# Patient Record
Sex: MALE | Race: WHITE | Employment: STUDENT | URBAN - METROPOLITAN AREA
[De-identification: names, ages, dates, MRNs, and addresses within clinical notes are randomized per-mention and may not be internally consistent; named-entity substitution may affect disease eponyms.]

---
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
## 2025-02-06 ENCOUNTER — DOCUMENTATION (OUTPATIENT)
Dept: FAMILY MEDICINE CLINIC | Facility: CLINIC | Age: 20
End: 2025-02-06
Payer: COMMERCIAL

## 2025-02-06 DIAGNOSIS — J10.1 INFLUENZA A: ICD-10-CM

## 2025-02-06 DIAGNOSIS — J11.1 INFLUENZA: Primary | ICD-10-CM

## 2025-02-24 ENCOUNTER — OFFICE VISIT (OUTPATIENT)
Dept: FAMILY MEDICINE CLINIC | Facility: CLINIC | Age: 20
End: 2025-02-24
Payer: COMMERCIAL

## 2025-02-24 VITALS
SYSTOLIC BLOOD PRESSURE: 124 MMHG | BODY MASS INDEX: 21.92 KG/M2 | TEMPERATURE: 98.2 F | OXYGEN SATURATION: 98 % | WEIGHT: 165.4 LBS | HEIGHT: 73 IN | HEART RATE: 79 BPM | DIASTOLIC BLOOD PRESSURE: 73 MMHG

## 2025-02-24 DIAGNOSIS — F41.9 ANXIETY AND DEPRESSION: Primary | ICD-10-CM

## 2025-02-24 DIAGNOSIS — F32.A ANXIETY AND DEPRESSION: Primary | ICD-10-CM

## 2025-02-24 PROCEDURE — 99213 OFFICE O/P EST LOW 20 MIN: CPT | Performed by: NURSE PRACTITIONER
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
## 2025-03-03 NOTE — PROGRESS NOTES
Chief Complaint  Anxiety (Follow up)    Subjective          Bam Martell is a 19 y.o. male who presents to [facility]     History of Present Illness  History of Present Illness  The patient presents for evaluation of ADHD, anxiety, and sleep disturbance.    He reports a positive response to his current medication regimen, with no adverse effects noted. He has resumed his academic pursuits without any significant issues. Despite not perceiving an improvement in his focus, he maintains satisfactory academic performance. He has not discussed his focus issues with his father.    His anxiety levels are reported as manageable. He has not yet initiated counseling sessions but is in the process of scheduling an appointment.    He experiences frequent awakenings at night, often accompanied by restlessness, but is able to return to sleep within approximately an hour. He attributes these sleep disturbances to preoccupation with daily events. Despite these interruptions, he reports being able to function adequately during the day.    IMMUNIZATIONS  He received a meningitis vaccine at age 16.      The PHQ has not been completed during this encounter.               Health Maintenance Due   Topic Date Due    HEPATITIS C SCREENING  Never done    MENINGOCOCCAL B VACCINE (1 of 2 - Standard) Never done    COVID-19 Vaccine (3 - 2024-25 season) 09/01/2024        Review of Systems   Constitutional:  Negative for chills, fatigue and fever.   Respiratory:  Negative for cough and shortness of breath.    Cardiovascular:  Negative for chest pain and palpitations.   Gastrointestinal:  Negative for constipation, diarrhea, nausea and vomiting.   Musculoskeletal:  Negative for back pain and neck pain.   Skin:  Negative for rash.   Neurological:  Negative for dizziness and headaches.          Medical History: has a past medical history of Anxiety.     Surgical History: has no past surgical history on file.      Family History: family history is not on file.     Social History: reports that he has never smoked. He has never been exposed to tobacco smoke. He has never used smokeless tobacco. He reports current alcohol use. He reports that he does not use drugs.    Allergies: Patient has no known allergies.      Current Outpatient Medications:     Dextromethorphan-buPROPion ER (AUVELITY)  MG tablet controlled-release, Take 1 tablet by mouth 2 (Two) Times a Day. Take once daily for 7 days then increase to bid therafter, Disp: 180 tablet, Rfl: 1    fexofenadine (ALLEGRA) 180 MG tablet, Take 1 tablet by mouth Daily., Disp: , Rfl:     hydrOXYzine (ATARAX) 25 MG tablet, Take 1 tablet by mouth Every 6 (Six) Hours As Needed for Anxiety., Disp: 90 tablet, Rfl: 1      Immunization History   Administered Date(s) Administered    COVID-19 (PFIZER) Purple Cap Monovalent 06/10/2021, 07/01/2021    DTaP 01/10/2006, 03/08/2006, 05/09/2006, 11/21/2006, 06/21/2011    DTaP, Unspecified 01/10/2006, 03/08/2006, 05/09/2006, 11/21/2006, 06/21/2011    Fluzone  >6mos 02/24/2025    Fluzone (or Fluarix & Flulaval for VFC) >6mos 11/22/2021, 10/13/2022, 11/06/2023    Fluzone Quad >6mos (Multi-dose) 11/02/2015, 12/26/2017, 11/15/2018, 10/11/2019, 10/22/2020    HPV Quadrivalent 07/27/2017    Hep A, 2 Dose 06/21/2011, 03/27/2012, 09/06/2018    Hep A, Unspecified 06/21/2011, 03/27/2012, 09/06/2018    Hep B, Adolescent or Pediatric 2005, 01/10/2006, 11/21/2006    Hep B, Unspecified 2005, 01/10/2006, 11/21/2006    Hepatitis A 02/12/2018    Hib (HbOC) 01/10/2006, 03/08/2006, 05/09/2006    Hpv9 06/06/2022    IPV 01/10/2006, 03/08/2006, 05/09/2006, 06/21/2011    Influenza TIV (IM) 11/29/2016    MMR 11/21/2006, 06/21/2011    Meningococcal Conjugate 06/06/2022    Meningococcal MCV4P (Menactra) 07/27/2017    Polio, Unspecified 01/10/2006, 03/08/2006, 05/09/2006, 06/21/2011    Tdap 07/27/2017    Varicella 12/28/2006, 06/21/2011         Objective  "      Vitals:    03/17/25 1204   BP: 136/70   Pulse: 58   Temp: 98.4 °F (36.9 °C)   TempSrc: Temporal   SpO2: 97%   Weight: 76.2 kg (168 lb)   Height: 185.4 cm (72.99\")      Body mass index is 22.17 kg/m².   Wt Readings from Last 3 Encounters:   03/17/25 76.2 kg (168 lb) (70%, Z= 0.53)*   02/24/25 75 kg (165 lb 6.4 oz) (67%, Z= 0.45)*   02/04/25 74.8 kg (165 lb) (67%, Z= 0.44)*     * Growth percentiles are based on CDC (Boys, 2-20 Years) data.      BP Readings from Last 3 Encounters:   03/17/25 136/70   02/24/25 124/73   02/04/25 108/62        43 %ile (Z= -0.17) based on CDC (Boys, 2-20 Years) BMI-for-age based on BMI available on 3/17/2025.    Physical Exam  Vitals reviewed.   Constitutional:       Appearance: Normal appearance. He is well-developed.   HENT:      Head: Normocephalic and atraumatic.   Eyes:      Conjunctiva/sclera: Conjunctivae normal.      Pupils: Pupils are equal, round, and reactive to light.   Cardiovascular:      Rate and Rhythm: Normal rate and regular rhythm.      Heart sounds: Normal heart sounds. No murmur heard.  Pulmonary:      Effort: Pulmonary effort is normal.      Breath sounds: Normal breath sounds. No wheezing or rhonchi.   Abdominal:      General: Bowel sounds are normal. There is no distension.      Palpations: Abdomen is soft.      Tenderness: There is no abdominal tenderness.   Skin:     General: Skin is warm and dry.   Neurological:      Mental Status: He is alert and oriented to person, place, and time.   Psychiatric:         Mood and Affect: Mood and affect normal.         Behavior: Behavior normal.         Thought Content: Thought content normal.         Judgment: Judgment normal.         Physical Exam        Result Review :   Results                            Assessment and Plan        Diagnoses and all orders for this visit:    1. Anxiety and depression (Primary)  -     Dextromethorphan-buPROPion ER (AUVELITY)  MG tablet controlled-release; Take 1 tablet by mouth 2 " (Two) Times a Day. Take once daily for 7 days then increase to bid therafter  Dispense: 180 tablet; Refill: 1    2. Insomnia, unspecified type  -     hydrOXYzine (ATARAX) 25 MG tablet; Take 1 tablet by mouth Every 6 (Six) Hours As Needed for Anxiety.  Dispense: 90 tablet; Refill: 1        Assessment & Plan  1. Attention Deficit Hyperactivity Disorder (ADHD).  He reports that his focus has not improved, but he is still making good grades. A prescription for hydroxyzine, to be taken as one tablet twice daily, will be provided. This medication is nonaddictive and may help him achieve a solid 6 hours of sleep. The prescription will be sent to [facility]. He is advised to continue monitoring his symptoms and report any significant changes.    2. Anxiety.  His anxiety is currently manageable. He is advised to continue monitoring his anxiety levels and report any instances where anxiety becomes disruptive to his daily activities.    3. Sleep Disturbance.  He reports difficulty staying asleep, waking up frequently during the night. Hydroxyzine will be prescribed to help improve sleep quality. He is advised to take one tablet twice daily as needed.    4. Medication Management.  He ran out of his current medication on Saturday. Samples of the medication will be provided to ensure continuity of treatment.    Follow-up  The patient will follow up in 6 months.    Return in about 6 months (around 9/17/2025) for Next scheduled follow up.    Patient was given instructions and counseling regarding his condition or for health maintenance advice. Please see specific information pulled into the AVS if appropriate.     ARNULFO Page    Patient or patient representative verbalized consent for the use of Ambient Listening during the visit with  ARNULFO Page for chart documentation. 3/17/2025  15:31 EDT

## 2025-03-17 ENCOUNTER — OFFICE VISIT (OUTPATIENT)
Dept: FAMILY MEDICINE CLINIC | Facility: CLINIC | Age: 20
End: 2025-03-17
Payer: COMMERCIAL

## 2025-03-17 VITALS
DIASTOLIC BLOOD PRESSURE: 70 MMHG | TEMPERATURE: 98.4 F | HEART RATE: 58 BPM | BODY MASS INDEX: 22.26 KG/M2 | HEIGHT: 73 IN | WEIGHT: 168 LBS | SYSTOLIC BLOOD PRESSURE: 136 MMHG | OXYGEN SATURATION: 97 %

## 2025-03-17 DIAGNOSIS — G47.00 INSOMNIA, UNSPECIFIED TYPE: ICD-10-CM

## 2025-03-17 DIAGNOSIS — F41.9 ANXIETY AND DEPRESSION: Primary | ICD-10-CM

## 2025-03-17 DIAGNOSIS — F32.A ANXIETY AND DEPRESSION: Primary | ICD-10-CM

## 2025-03-17 PROCEDURE — 99213 OFFICE O/P EST LOW 20 MIN: CPT | Performed by: NURSE PRACTITIONER

## 2025-03-17 RX ORDER — HYDROXYZINE HYDROCHLORIDE 25 MG/1
25 TABLET, FILM COATED ORAL EVERY 6 HOURS PRN
Qty: 90 TABLET | Refills: 1 | Status: SHIPPED | OUTPATIENT
Start: 2025-03-17

## 2025-03-17 NOTE — PATIENT INSTRUCTIONS
Generalized Anxiety Disorder, Adult  Generalized anxiety disorder (TITO) is a mental health condition. Unlike normal worries, anxiety related to TITO is not triggered by a specific event. These worries do not fade or get better with time. TITO interferes with relationships, work, and school.  TITO symptoms can vary from mild to severe. People with severe TITO can have intense waves of anxiety with physical symptoms that are similar to panic attacks.  What are the causes?  The exact cause of TITO is not known, but the following are believed to have an impact:  Differences in natural brain chemicals.  Genes passed down from parents to children.  Differences in the way threats are perceived.  Development and stress during childhood.  Personality.  What increases the risk?  The following factors may make you more likely to develop this condition:  Being female.  Having a family history of anxiety disorders.  Being very shy.  Experiencing very stressful life events, such as the death of a loved one.  Having a very stressful family environment.  What are the signs or symptoms?  People with TITO often worry excessively about many things in their lives, such as their health and family. Symptoms may also include:  Mental and emotional symptoms:  Worrying excessively about natural disasters.  Fear of being late.  Difficulty concentrating.  Fears that others are judging your performance.  Physical symptoms:  Fatigue.  Headaches, muscle tension, muscle twitches, trembling, or feeling shaky.  Feeling like your heart is pounding or beating very fast.  Feeling out of breath or like you cannot take a deep breath.  Having trouble falling asleep or staying asleep, or experiencing restlessness.  Sweating.  Nausea, diarrhea, or irritable bowel syndrome (IBS).  Behavioral symptoms:  Experiencing erratic moods or irritability.  Avoidance of new situations.  Avoidance of people.  Extreme difficulty making decisions.  How is this diagnosed?  This  condition is diagnosed based on your symptoms and medical history. You will also have a physical exam. Your health care provider may perform tests to rule out other possible causes of your symptoms.  To be diagnosed with TITO, a person must have anxiety that:  Is out of his or her control.  Affects several different aspects of his or her life, such as work and relationships.  Causes distress that makes him or her unable to take part in normal activities.  Includes at least three symptoms of TITO, such as restlessness, fatigue, trouble concentrating, irritability, muscle tension, or sleep problems.  Before your health care provider can confirm a diagnosis of TITO, these symptoms must be present more days than they are not, and they must last for 6 months or longer.  How is this treated?  This condition may be treated with:  Medicine. Antidepressant medicine is usually prescribed for long-term daily control. Anti-anxiety medicines may be added in severe cases, especially when panic attacks occur.  Talk therapy (psychotherapy). Certain types of talk therapy can be helpful in treating TITO by providing support, education, and guidance. Options include:  Cognitive behavioral therapy (CBT). People learn coping skills and self-calming techniques to ease their physical symptoms. They learn to identify unrealistic thoughts and behaviors and to replace them with more appropriate thoughts and behaviors.  Acceptance and commitment therapy (ACT). This treatment teaches people how to be mindful as a way to cope with unwanted thoughts and feelings.  Biofeedback. This process trains you to manage your body's response (physiological response) through breathing techniques and relaxation methods. You will work with a therapist while machines are used to monitor your physical symptoms.  Stress management techniques. These include yoga, meditation, and exercise.  A mental health specialist can help determine which treatment is best for you.  Some people see improvement with one type of therapy. However, other people require a combination of therapies.  Follow these instructions at home:  Lifestyle  Maintain a consistent routine and schedule.  Anticipate stressful situations. Create a plan and allow extra time to work with your plan.  Practice stress management or self-calming techniques that you have learned from your therapist or your health care provider.  Exercise regularly and spend time outdoors.  Eat a healthy diet that includes plenty of vegetables, fruits, whole grains, low-fat dairy products, and lean protein.  Do not eat a lot of foods that are high in fat, added sugar, or salt (sodium).  Drink plenty of water.  Avoid alcohol. Alcohol can increase anxiety.  Avoid caffeine and certain over-the-counter cold medicines. These may make you feel worse. Ask your pharmacist which medicines to avoid.  General instructions  Take over-the-counter and prescription medicines only as told by your health care provider.  Understand that you are likely to have setbacks. Accept this and be kind to yourself as you persist to take better care of yourself.  Anticipate stressful situations. Create a plan and allow extra time to work with your plan.  Recognize and accept your accomplishments, even if you  them as small.  Spend time with people who care about you.  Keep all follow-up visits. This is important.  Where to find more information  [employer] [place] [employer] Health: www.nimh.nih.gov  Substance Abuse and Mental Health Services: www.samhsa.gov  Contact a health care provider if:  Your symptoms do not get better.  Your symptoms get worse.  You have signs of depression, such as:  A persistently sad or irritable mood.  Loss of enjoyment in activities that used to bring you ling.  Change in weight or eating.  Changes in sleeping habits.  Get help right away if:  You have thoughts about hurting yourself or others.  If you ever feel like you may hurt  yourself or others, or have thoughts about taking your own life, get help right away. Go to your nearest emergency department or:  Call your local emergency services (911 in the [place]  Call a suicide crisis helpline, such as the National Suicide Prevention Lifeline at 1-410.177.3457 or 655 in the [place] This is open 24 hours a day in the [place]  If you’re a :  Call 988 and press 1. This is open 24 hours a day.  Text the Veterans Crisis Line at 182483.  Summary  Generalized anxiety disorder (TITO) is a mental health condition that involves worry that is not triggered by a specific event.  People with TITO often worry excessively about many things in their lives, such as their health and family.  TITO may cause symptoms such as restlessness, trouble concentrating, sleep problems, frequent sweating, nausea, diarrhea, headaches, and trembling or muscle twitching.  A mental health specialist can help determine which treatment is best for you. Some people see improvement with one type of therapy. However, other people require a combination of therapies.  This information is not intended to replace advice given to you by your health care provider. Make sure you discuss any questions you have with your health care provider.  Document Revised: 08/01/2024 Document Reviewed: 04/10/2022  [employer]vier Patient Education © 2024 [employer]

## 2025-04-15 ENCOUNTER — TELEPHONE (OUTPATIENT)
Dept: FAMILY MEDICINE CLINIC | Facility: CLINIC | Age: 20
End: 2025-04-15
Payer: COMMERCIAL

## 2025-04-15 RX ORDER — BUPROPION HYDROCHLORIDE 150 MG/1
150 TABLET ORAL DAILY
Qty: 90 TABLET | Refills: 3 | Status: SHIPPED | OUTPATIENT
Start: 2025-04-15

## 2025-04-15 NOTE — TELEPHONE ENCOUNTER
Kerri from [facility] called to let you know   Dextromethorphan-buPROPion ER (AUVELITY)  MG tablet controlled-release  is not covered by insurance.  The alternatives are:    Bupropion  Vilazodone  Citalopram  Escitalopram  Paroxetine  Sertraline  Duloxetine  Venlafaxine  Desvenlafaxine     Has to try and fail all of these before a PA can be submitted for Auvelity